# Patient Record
Sex: MALE | Race: BLACK OR AFRICAN AMERICAN | NOT HISPANIC OR LATINO
[De-identification: names, ages, dates, MRNs, and addresses within clinical notes are randomized per-mention and may not be internally consistent; named-entity substitution may affect disease eponyms.]

---

## 2023-02-08 ENCOUNTER — APPOINTMENT (OUTPATIENT)
Dept: ORTHOPEDIC SURGERY | Facility: CLINIC | Age: 33
End: 2023-02-08
Payer: COMMERCIAL

## 2023-02-08 DIAGNOSIS — Z78.9 OTHER SPECIFIED HEALTH STATUS: ICD-10-CM

## 2023-02-08 DIAGNOSIS — M23.41 LOOSE BODY IN KNEE, RIGHT KNEE: ICD-10-CM

## 2023-02-08 DIAGNOSIS — M75.41 IMPINGEMENT SYNDROME OF RIGHT SHOULDER: ICD-10-CM

## 2023-02-08 PROCEDURE — 99203 OFFICE O/P NEW LOW 30 MIN: CPT

## 2023-02-08 PROCEDURE — 73030 X-RAY EXAM OF SHOULDER: CPT | Mod: RT

## 2023-02-08 PROCEDURE — 73564 X-RAY EXAM KNEE 4 OR MORE: CPT | Mod: 50

## 2023-02-08 NOTE — ASSESSMENT
[FreeTextEntry1] : Underlying pathology reviewed and treatment options discussed. \par 02/08/2023 : xrays B/L knees, 4 views,  reveal left knee with OCD of the lateral and medial femoral condyle. \par Xrays of the right knee reveals loose body of the trochlea. \par Obtain MRI right knee eval loose body. mech symptoms\par obtain MRI of the left knee eval med and lateral OCD.\par Activity modifier as tolerated.\par OTC aleve. \par Referred to Dr. agosto after MRis for possible surgical dicussion for consideration on excision loose body right knee with mech symptoms. \par left knee to consider MANOLO for cartilage restoration \par Questions addressed.\par \par The documentation recorded by the scribe accurately reflects the service I personally performed and the decisions made by me.\par I, Shana Puenteibe, attest that this documentation has been prepared under the direction and in the presence of Provider Christopher Jenkins MD.\par \par The patient was seen by Christopher Jenkins MD.\par The following radiographs were ordered and read by me during this patient's visit. I reviewed each radiograph in detail with the patient, and discussed the findings as highlighted below.\par

## 2023-02-08 NOTE — PHYSICAL EXAM
[Sitting] : sitting [5 ___] : forward flexion 5[unfilled]/5 [Right] : right knee [5___] : hamstring 5[unfilled]/5 [Left] : left knee [Positive] : positive Gm [TWNoteComboBox4] : passive forward flexion 170 degrees [TWNoteComboBox6] : internal rotation L4 [] : no atrophy [TWNoteComboBox7] : flexion 125 degrees [de-identified] : extension 30 degrees

## 2023-02-08 NOTE — HISTORY OF PRESENT ILLNESS
[Gradual] : gradual [7] : 7 [0] : 0 [Dull/Aching] : dull/aching [Localized] : localized [Occasional] : occasional [Household chores] : household chores [Leisure] : leisure [Rest] : rest [Walking] : walking [Full time] : Work status: full time [de-identified] : This is a 31 YO male with B/L knee pain, the right knee pain since 2016 with feeling of something getting stuck in the knee. 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 Denies night symptoms. tried nsaids. Tried ice/heat therapy. Frequents gym and HEP independently [] : no [FreeTextEntry1] : B/L knees, RT shoulder [FreeTextEntry5] : This is a 33 y/o M here for bilateral knee and right shoulder eval. Left knee pain began 4 yrs ago, right knee pain began 1 yr ago. Right shoulder pain began about 1 year ago. NKI. No hx of sx to the knees or shoulder. Pt feels tightness when running in right knee. Pain is most prevalent in shoulder when lifting weights. No n/t.  [de-identified] : none [de-identified] :

## 2023-02-24 ENCOUNTER — APPOINTMENT (OUTPATIENT)
Dept: ORTHOPEDIC SURGERY | Facility: CLINIC | Age: 33
End: 2023-02-24

## 2023-03-01 ENCOUNTER — APPOINTMENT (OUTPATIENT)
Dept: ORTHOPEDIC SURGERY | Facility: CLINIC | Age: 33
End: 2023-03-01
Payer: COMMERCIAL

## 2023-03-01 VITALS — BODY MASS INDEX: 26.51 KG/M2 | WEIGHT: 200 LBS | HEIGHT: 73 IN

## 2023-03-01 DIAGNOSIS — M95.8 OTHER SPECIFIED ACQUIRED DEFORMITIES OF MUSCULOSKELETAL SYSTEM: ICD-10-CM

## 2023-03-01 PROCEDURE — 99212 OFFICE O/P EST SF 10 MIN: CPT

## 2023-03-01 NOTE — DATA REVIEWED
[MRI] : MRI [Bilateral] : of the bilateral [Knee] : knee [I reviewed the films/CD] : I reviewed the films/CD

## 2023-03-01 NOTE — PHYSICAL EXAM
[Sitting] : sitting [5 ___] : forward flexion 5[unfilled]/5 [Right] : right knee [5___] : hamstring 5[unfilled]/5 [Left] : left knee [Positive] : positive Gm [TWNoteComboBox4] : passive forward flexion 170 degrees [TWNoteComboBox6] : internal rotation L4 [] : no atrophy [TWNoteComboBox7] : flexion 125 degrees [de-identified] : extension 30 degrees

## 2023-03-01 NOTE — HISTORY OF PRESENT ILLNESS
[Gradual] : gradual [7] : 7 [0] : 0 [Dull/Aching] : dull/aching [Localized] : localized [Occasional] : occasional [Household chores] : household chores [Leisure] : leisure [Rest] : rest [Walking] : walking [Exercising] : exercising [Full time] : Work status: full time [de-identified] : This is a 31 YO male with B/L knee pain, the right knee pain since 2016 with feeling of something getting stuck in the knee. 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 Denies night symptoms. tried nsaids. Tried ice/heat therapy. Frequents gym and HEP independently [] : no [FreeTextEntry1] : B/L knees [FreeTextEntry5] : JODY is a 32 year old M who is here today for MRI results of B/L knee. Patient states that the pain has gotten worse since last visit.\par  [de-identified] : MRI [de-identified] :

## 2023-03-01 NOTE — ASSESSMENT
[FreeTextEntry1] : Underlying pathology reviewed and treatment options discussed. \par 02/08/2023 : xrays B/L knees, 4 views,  reveal left knee with OCD of the lateral and medial femoral condyle. \par Xrays of the right knee reveals loose body of the trochlea. \par Obtain MRI right knee eval loose body. mech symptoms\par obtain MRI of the left knee eval med and lateral OCD.\par Activity modifier as tolerated.\par OTC aleve. \par Referred to Dr. agosto after MRis for possible surgical dicussion for consideration on excision loose body right knee with mech symptoms. \par left knee to consider MANOLO for cartilage restoration \par Questions addressed.\par \par He was unable to consult with Simran due to lack of referral. \par MRI briefly reviewed. \par I cannot say how/why this happened. \par \par The documentation recorded by the scribe accurately reflects the service I personally performed and the decisions made by me.\par I, Radha Puente, attest that this documentation has been prepared under the direction and in the presence of Provider Christopher Jenkins MD.\par \par The patient was seen by Christopher Jenkins MD.\par The following radiographs were ordered and read by me during this patient's visit. I reviewed each radiograph in detail with the patient, and discussed the findings as highlighted below.\par

## 2023-03-16 ENCOUNTER — APPOINTMENT (OUTPATIENT)
Dept: ORTHOPEDIC SURGERY | Facility: CLINIC | Age: 33
End: 2023-03-16
Payer: COMMERCIAL

## 2023-03-16 VITALS — BODY MASS INDEX: 26.51 KG/M2 | WEIGHT: 200 LBS | HEIGHT: 73 IN

## 2023-03-16 DIAGNOSIS — M67.51 PLICA SYNDROME, RIGHT KNEE: ICD-10-CM

## 2023-03-16 DIAGNOSIS — M23.8X1 OTHER INTERNAL DERANGEMENTS OF RIGHT KNEE: ICD-10-CM

## 2023-03-16 DIAGNOSIS — M67.52 PLICA SYNDROME, LEFT KNEE: ICD-10-CM

## 2023-03-16 DIAGNOSIS — M95.8 OTHER SPECIFIED ACQUIRED DEFORMITIES OF MUSCULOSKELETAL SYSTEM: ICD-10-CM

## 2023-03-16 PROCEDURE — 99214 OFFICE O/P EST MOD 30 MIN: CPT

## 2023-03-16 NOTE — HISTORY OF PRESENT ILLNESS
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  Seen Dr. Jenkins and had xray and mris.\par

## 2023-03-16 NOTE — ASSESSMENT
[FreeTextEntry1] : xrays from 2/8/23 - R knee quest PF loos body / Left knee MFC and LFC OCDs\par mri right knee LHR 2/13/23 - focal chondral defect of the MFC 2.4cm AP X 1.6cm ML with medial sided fissure adj, plica, ext tendinopathy\par mri left knee LHR 2/13/23 - osteochondral defect LFC 14mm m AP X 7mm ML with fissue over post margin, osteochondral defect MFC 2.2cm AP X 1.3cm ML, plica\par \par \par \par - We discussed their diagnosis and treatment options at length including the risks and benefits of both surgical and non-surgical options.\par - We will continue conservative treatment with icing, anti-inflammatory medication, and PT \par - The patient was provided with a prescription to work on hip ER/abductors strengthening along with quad/hamstring stretches and strengthening \par - The patient was advised to let pain guide the gradual advancement of activities. \par - Hinged Knee brace in order to minimize buckling and instability and try and allow OCDs to heal\par - Follow up in 6 weeks to re-evaluate.\par \par for right knee if cont symptoms discussed surgery for  poss R knee LBR, chondroplasty, plica excision, biopsy (for poss subq ACI)\par for left knee if cont symptoms discussed surgery for poss L OCD drilling, plica, biopsy (for subq ACI) vs OCA\par \par

## 2023-03-16 NOTE — IMAGING
[de-identified] : \par RIGHT KNEE\par Inspection:  mild effusion\par Palpation: medial femoral condyle tenderness \par Knee Range of Motion:  0-130 \par Strength: 5/5 Quadriceps strength, 5/5 Hamstring strength\par Neurological: light touch is intact throughout\par Ligament Stability and Special Tests: \par McMurrays: Positive\par Lachman: neg\par Pivot Shift: neg\par Posterior Drawer: neg\par Valgus: neg\par Varus: neg\par Patella Apprehension: neg\par Patella Maltracking: neg\par \par \par LEFT KNEE\par Inspection:  mild effusion\par Palpation: medial and lateral lateral femoral condyle tenderness , medial facet patella tenderness\par Knee Range of Motion:  0-130 \par Strength: 5/5 Quadriceps strength, 5/5 Hamstring strength\par Neurological: light touch is intact throughout\par Ligament Stability and Special Tests: \par McMurrays: Positive\par Lachman: neg\par Pivot Shift: neg\par Posterior Drawer: neg\par Valgus: neg\par Varus: neg\par Patella Apprehension: neg\par Patella Maltracking: neg\par \par

## 2023-03-16 NOTE — DATA REVIEWED
[MRI] : MRI [Outside X-rays] : outside x-rays [Bilateral] : of the bilateral [Knee] : knee [I independently reviewed and interpreted images and report] : I independently reviewed and interpreted images and report